# Patient Record
Sex: FEMALE | Race: WHITE | Employment: OTHER | URBAN - METROPOLITAN AREA
[De-identification: names, ages, dates, MRNs, and addresses within clinical notes are randomized per-mention and may not be internally consistent; named-entity substitution may affect disease eponyms.]

---

## 2017-09-14 ENCOUNTER — HOSPITAL ENCOUNTER (OUTPATIENT)
Dept: GENERAL RADIOLOGY | Age: 67
Discharge: HOME | End: 2017-09-14
Attending: OBSTETRICS & GYNECOLOGY
Payer: MEDICARE

## 2017-09-14 ENCOUNTER — HOSPITAL ENCOUNTER (OUTPATIENT)
Dept: PREADMISSION TESTING | Age: 67
Discharge: HOME | End: 2017-09-14
Attending: OBSTETRICS & GYNECOLOGY
Payer: MEDICARE

## 2017-09-14 VITALS
TEMPERATURE: 98.5 F | OXYGEN SATURATION: 97 % | SYSTOLIC BLOOD PRESSURE: 105 MMHG | HEIGHT: 64 IN | WEIGHT: 126.76 LBS | HEART RATE: 77 BPM | BODY MASS INDEX: 21.64 KG/M2 | DIASTOLIC BLOOD PRESSURE: 65 MMHG | RESPIRATION RATE: 18 BRPM

## 2017-09-14 LAB
ANION GAP SERPL CALC-SCNC: 7 MMOL/L (ref 5–15)
APPEARANCE UR: CLEAR
BACTERIA URNS QL MICRO: NEGATIVE /HPF
BILIRUB UR QL: NEGATIVE
BUN SERPL-MCNC: 25 MG/DL (ref 6–20)
BUN/CREAT SERPL: 27 (ref 12–20)
CALCIUM SERPL-MCNC: 8.7 MG/DL (ref 8.5–10.1)
CHLORIDE SERPL-SCNC: 105 MMOL/L (ref 97–108)
CO2 SERPL-SCNC: 28 MMOL/L (ref 21–32)
COLOR UR: NORMAL
CREAT SERPL-MCNC: 0.93 MG/DL (ref 0.55–1.02)
EPITH CASTS URNS QL MICRO: NORMAL /LPF
ERYTHROCYTE [DISTWIDTH] IN BLOOD BY AUTOMATED COUNT: 13.3 % (ref 11.5–14.5)
GLUCOSE SERPL-MCNC: 102 MG/DL (ref 65–100)
GLUCOSE UR STRIP.AUTO-MCNC: NEGATIVE MG/DL
HCT VFR BLD AUTO: 41 % (ref 35–47)
HGB BLD-MCNC: 13.4 G/DL (ref 11.5–16)
HGB UR QL STRIP: NEGATIVE
KETONES UR QL STRIP.AUTO: NEGATIVE MG/DL
LEUKOCYTE ESTERASE UR QL STRIP.AUTO: NEGATIVE
MCH RBC QN AUTO: 29.8 PG (ref 26–34)
MCHC RBC AUTO-ENTMCNC: 32.7 G/DL (ref 30–36.5)
MCV RBC AUTO: 91.1 FL (ref 80–99)
NITRITE UR QL STRIP.AUTO: NEGATIVE
PH UR STRIP: 7 [PH] (ref 5–8)
PLATELET # BLD AUTO: 312 K/UL (ref 150–400)
POTASSIUM SERPL-SCNC: 4.1 MMOL/L (ref 3.5–5.1)
PROT UR STRIP-MCNC: NEGATIVE MG/DL
RBC # BLD AUTO: 4.5 M/UL (ref 3.8–5.2)
RBC #/AREA URNS HPF: NORMAL /HPF (ref 0–5)
SODIUM SERPL-SCNC: 140 MMOL/L (ref 136–145)
SP GR UR REFRACTOMETRY: 1.01 (ref 1–1.03)
UA: UC IF INDICATED,UAUC: NORMAL
UROBILINOGEN UR QL STRIP.AUTO: 0.2 EU/DL (ref 0.2–1)
WBC # BLD AUTO: 6.3 K/UL (ref 3.6–11)
WBC URNS QL MICRO: NORMAL /HPF (ref 0–4)

## 2017-09-14 PROCEDURE — 81001 URINALYSIS AUTO W/SCOPE: CPT | Performed by: OBSTETRICS & GYNECOLOGY

## 2017-09-14 PROCEDURE — 36415 COLL VENOUS BLD VENIPUNCTURE: CPT | Performed by: OBSTETRICS & GYNECOLOGY

## 2017-09-14 PROCEDURE — 85027 COMPLETE CBC AUTOMATED: CPT | Performed by: OBSTETRICS & GYNECOLOGY

## 2017-09-14 PROCEDURE — 80048 BASIC METABOLIC PNL TOTAL CA: CPT | Performed by: OBSTETRICS & GYNECOLOGY

## 2017-09-14 PROCEDURE — 71020 XR CHEST PA LAT: CPT

## 2017-09-14 PROCEDURE — 93005 ELECTROCARDIOGRAM TRACING: CPT

## 2017-09-14 RX ORDER — ACETAMINOPHEN/DIPHENHYDRAMINE 500MG-25MG
1 TABLET ORAL
COMMUNITY

## 2017-09-14 RX ORDER — CHOLECALCIFEROL (VITAMIN D3) 125 MCG
300 CAPSULE ORAL
COMMUNITY

## 2017-09-14 RX ORDER — ROSUVASTATIN CALCIUM 5 MG/1
5 TABLET, COATED ORAL
COMMUNITY

## 2017-09-14 RX ORDER — CHOLECALCIFEROL (VITAMIN D3) 125 MCG
1 CAPSULE ORAL DAILY
COMMUNITY

## 2017-09-14 NOTE — PERIOP NOTES
[facility]  Preoperative Instructions        Surgery Date 09/28/17        Time of Arrival 8am    1. On the day of your surgery, please report to the Surgical Services Registration Desk and sign in at your designated time. The Surgery Center is located to the right of the Emergency Room. 2. You must have someone with you to drive you home. You should not drive a car for 24 hours following surgery. Please make arrangements for a friend or family member to stay with you for the first 24 hours after your surgery. 3. Do not have anything to eat or drink (including water, gum, mints, coffee, juice) after midnight 09/27/17?? Willian Dials ? This may not apply to medications prescribed by your physician. ?(Please note below the special instructions with medications to take the morning of your procedure.)    4. We recommend you do not drink any alcoholic beverages for 24 hours before and after your surgery. 5. Contact your surgeons office for instructions on the following medications: non-steroidal anti-inflammatory drugs (i.e. Advil, Aleve), vitamins, and supplements. (Some surgeons will want you to stop these medications prior to surgery and others may allow you to take them)  **If you are currently taking Plavix, Coumadin, Aspirin and/or other blood-thinning agents, contact your surgeon for instructions. ** Your surgeon will partner with the physician prescribing these medications to determine if it is safe to stop or if you need to continue taking. Please do not stop taking these medications without instructions from your surgeon    6. Wear comfortable clothes. Wear glasses instead of contacts. Do not bring any money or jewelry. Please bring picture ID, insurance card, and any prearranged co-payment or hospital payment. Do not wear make-up, particularly mascara the morning of your surgery. Do not wear nail polish, particularly if you are having foot /hand surgery.   Wear your hair loose or down, no ponytails, buns, vi pins or clips. All body piercings must be removed. Please shower with antibacterial soap for three consecutive days before and on the morning of surgery, but do not apply any lotions, powders or deodorants after the shower on the day of surgery. Please use a fresh towels after each shower. Please sleep in clean clothes and change bed linens the night before surgery. Please do not shave for 48 hours prior to surgery. Shaving of the face is acceptable. 7. You should understand that if you do not follow these instructions your surgery may be cancelled. If your physical condition changes (I.e. fever, cold or flu) please contact your surgeon as soon as possible. 8. It is important that you be on time. If a situation occurs where you may be late, please call [phone] (OR Holding Area). 9. If you have any questions and or problems, please call [phone] (Pre-admission Testing). 10. Your surgery time may be subject to change. You will receive a phone call the evening prior if your time changes. 11.  If having outpatient surgery, you must have someone to drive you here, stay with you during the duration of your stay, and to drive you home at time of discharge. 12.   In an effort to improve the efficiency, privacy, and safety for all of our Pre-op patients visitors are not allowed in the Holding area. Once you arrive and are registered your family/visitors will be asked to remain in the waiting room. The Pre-op staff will get you from the Surgical Waiting Area and will explain to you and your family/visitors that the Pre-op phase is beginning. The staff will answer any questions and provide instructions for tracking of the patient, by use of the existing tracking number and color-coded status board in the waiting room.   At this time the staff will also ask for your designated spokesperson information in the event that the physician or staff need to provide an update or obtain any pertinent information. The designated spokesperson will be notified if the physician needs to speak to family during the pre-operative phase. If at any time your family/visitors has questions or concerns they may approach the volunteer desk in the waiting area for assistance. Special Instructions:    MEDICATIONS TO TAKE THE MORNING OF SURGERY WITH A SIP OF WATER:none      I understand a pre-operative phone call will be made to verify my surgery time. In the event that I am not available, I give permission for a message to be left on my answering service and/or with another person?   {yes [phone]         ___________________      __________   _________    (Signature of Patient)             (Witness)                (Date and Time)

## 2017-09-15 LAB
ATRIAL RATE: 74 BPM
CALCULATED P AXIS, ECG09: 69 DEGREES
CALCULATED R AXIS, ECG10: -4 DEGREES
CALCULATED T AXIS, ECG11: 45 DEGREES
DIAGNOSIS, 93000: NORMAL
P-R INTERVAL, ECG05: 164 MS
Q-T INTERVAL, ECG07: 394 MS
QRS DURATION, ECG06: 66 MS
QTC CALCULATION (BEZET), ECG08: 437 MS
VENTRICULAR RATE, ECG03: 74 BPM

## 2017-09-15 NOTE — PERIOP NOTES
Patient called this morning asking if clearance, notes, and EKG had been sent from Dr. Skye Hoskins office. Notes and EKG received but not clearance. Dr. Kimberly Merida office called and clearance form requested. Fax received from Dr. Kimberly Merida office with clearance signature. Returned call to patient to confirm that information was received.

## 2017-09-27 NOTE — PERIOP NOTES
Called Dr Beatriz Pettit office and message for Susan-  in regard to surgical posting not matching order sheet.  to call me back.

## 2017-09-28 ENCOUNTER — ANESTHESIA EVENT (OUTPATIENT)
Dept: SURGERY | Age: 67
End: 2017-09-28
Payer: MEDICARE

## 2017-09-28 ENCOUNTER — ANESTHESIA (OUTPATIENT)
Dept: SURGERY | Age: 67
End: 2017-09-28
Payer: MEDICARE

## 2017-09-28 ENCOUNTER — HOSPITAL ENCOUNTER (OUTPATIENT)
Age: 67
Discharge: HOME | End: 2017-09-29
Attending: OBSTETRICS & GYNECOLOGY | Admitting: OBSTETRICS & GYNECOLOGY
Payer: MEDICARE

## 2017-09-28 PROBLEM — R32 INCONTINENCE OF URINE IN FEMALE: Status: ACTIVE | Noted: 2017-09-28

## 2017-09-28 PROBLEM — N81.10 VAGINAL PROLAPSE: Status: ACTIVE | Noted: 2017-09-28

## 2017-09-28 PROBLEM — N99.3 PELVIC RELAXATION DUE TO VAGINAL VAULT PROLAPSE, POSTHYSTERECTOMY: Status: ACTIVE | Noted: 2017-09-28

## 2017-09-28 PROCEDURE — 74011250636 HC RX REV CODE- 250/636

## 2017-09-28 PROCEDURE — 76210000016 HC OR PH I REC 1 TO 1.5 HR: Performed by: OBSTETRICS & GYNECOLOGY

## 2017-09-28 PROCEDURE — 77030031139 HC SUT VCRL2 J&J -A: Performed by: OBSTETRICS & GYNECOLOGY

## 2017-09-28 PROCEDURE — 76060000034 HC ANESTHESIA 1.5 TO 2 HR: Performed by: OBSTETRICS & GYNECOLOGY

## 2017-09-28 PROCEDURE — 77030008684 HC TU ET CUF COVD -B: Performed by: NURSE ANESTHETIST, CERTIFIED REGISTERED

## 2017-09-28 PROCEDURE — 77030002933 HC SUT MCRYL J&J -A: Performed by: OBSTETRICS & GYNECOLOGY

## 2017-09-28 PROCEDURE — 74011250636 HC RX REV CODE- 250/636: Performed by: OBSTETRICS & GYNECOLOGY

## 2017-09-28 PROCEDURE — 74011000258 HC RX REV CODE- 258: Performed by: OBSTETRICS & GYNECOLOGY

## 2017-09-28 PROCEDURE — 74011250636 HC RX REV CODE- 250/636: Performed by: ANESTHESIOLOGY

## 2017-09-28 PROCEDURE — 77030034849: Performed by: OBSTETRICS & GYNECOLOGY

## 2017-09-28 PROCEDURE — 74011250637 HC RX REV CODE- 250/637: Performed by: OBSTETRICS & GYNECOLOGY

## 2017-09-28 PROCEDURE — 77030002880 HC SUT CAPIO BSC -B: Performed by: OBSTETRICS & GYNECOLOGY

## 2017-09-28 PROCEDURE — 77030003029 HC SUT VCRL J&J -B: Performed by: OBSTETRICS & GYNECOLOGY

## 2017-09-28 PROCEDURE — 77030011640 HC PAD GRND REM COVD -A: Performed by: OBSTETRICS & GYNECOLOGY

## 2017-09-28 PROCEDURE — 74011000272 HC RX REV CODE- 272: Performed by: OBSTETRICS & GYNECOLOGY

## 2017-09-28 PROCEDURE — 77010033678 HC OXYGEN DAILY

## 2017-09-28 PROCEDURE — 77030008064 HC RNG RETRCTR COOP -B: Performed by: OBSTETRICS & GYNECOLOGY

## 2017-09-28 PROCEDURE — 77030010507 HC ADH SKN DERMBND J&J -B: Performed by: OBSTETRICS & GYNECOLOGY

## 2017-09-28 PROCEDURE — 77030018836 HC SOL IRR NACL ICUM -A: Performed by: OBSTETRICS & GYNECOLOGY

## 2017-09-28 PROCEDURE — 77030032490 HC SLV COMPR SCD KNE COVD -B: Performed by: OBSTETRICS & GYNECOLOGY

## 2017-09-28 PROCEDURE — 77030012961 HC IRR KT CYSTO/TUR ICUM -A: Performed by: OBSTETRICS & GYNECOLOGY

## 2017-09-28 PROCEDURE — 77030010011 HC RNG RETRCTR STAY COOP -B: Performed by: OBSTETRICS & GYNECOLOGY

## 2017-09-28 PROCEDURE — 74011000250 HC RX REV CODE- 250: Performed by: OBSTETRICS & GYNECOLOGY

## 2017-09-28 PROCEDURE — 74011000250 HC RX REV CODE- 250

## 2017-09-28 PROCEDURE — 76010000153 HC OR TIME 1.5 TO 2 HR: Performed by: OBSTETRICS & GYNECOLOGY

## 2017-09-28 PROCEDURE — C1771 REP DEV, URINARY, W/SLING: HCPCS | Performed by: OBSTETRICS & GYNECOLOGY

## 2017-09-28 PROCEDURE — 77030026438 HC STYL ET INTUB CARD -A: Performed by: NURSE ANESTHETIST, CERTIFIED REGISTERED

## 2017-09-28 PROCEDURE — 77030020782 HC GWN BAIR PAWS FLX 3M -B

## 2017-09-28 DEVICE — OBTURATOR SYSTEM, TENSION-FREE SUPPORT FOR INCONTINENCE
Type: IMPLANTABLE DEVICE | Site: BLADDER | Status: FUNCTIONAL
Brand: GYNECARE TVT

## 2017-09-28 RX ORDER — SODIUM CHLORIDE, SODIUM LACTATE, POTASSIUM CHLORIDE, CALCIUM CHLORIDE 600; 310; 30; 20 MG/100ML; MG/100ML; MG/100ML; MG/100ML
75 INJECTION, SOLUTION INTRAVENOUS CONTINUOUS
Status: DISCONTINUED | OUTPATIENT
Start: 2017-09-28 | End: 2017-09-28 | Stop reason: HOSPADM

## 2017-09-28 RX ORDER — ONDANSETRON 2 MG/ML
4 INJECTION INTRAMUSCULAR; INTRAVENOUS
Status: DISCONTINUED | OUTPATIENT
Start: 2017-09-28 | End: 2017-09-29 | Stop reason: HOSPADM

## 2017-09-28 RX ORDER — MORPHINE SULFATE 10 MG/ML
2 INJECTION, SOLUTION INTRAMUSCULAR; INTRAVENOUS
Status: DISCONTINUED | OUTPATIENT
Start: 2017-09-28 | End: 2017-09-28 | Stop reason: HOSPADM

## 2017-09-28 RX ORDER — FENTANYL CITRATE 50 UG/ML
50 INJECTION, SOLUTION INTRAMUSCULAR; INTRAVENOUS AS NEEDED
Status: DISCONTINUED | OUTPATIENT
Start: 2017-09-28 | End: 2017-09-28 | Stop reason: HOSPADM

## 2017-09-28 RX ORDER — ROCURONIUM BROMIDE 10 MG/ML
INJECTION, SOLUTION INTRAVENOUS AS NEEDED
Status: DISCONTINUED | OUTPATIENT
Start: 2017-09-28 | End: 2017-09-28 | Stop reason: HOSPADM

## 2017-09-28 RX ORDER — LIDOCAINE HYDROCHLORIDE 20 MG/ML
INJECTION, SOLUTION EPIDURAL; INFILTRATION; INTRACAUDAL; PERINEURAL AS NEEDED
Status: DISCONTINUED | OUTPATIENT
Start: 2017-09-28 | End: 2017-09-28 | Stop reason: HOSPADM

## 2017-09-28 RX ORDER — SODIUM CHLORIDE 0.9 % (FLUSH) 0.9 %
5-10 SYRINGE (ML) INJECTION AS NEEDED
Status: DISCONTINUED | OUTPATIENT
Start: 2017-09-28 | End: 2017-09-28 | Stop reason: HOSPADM

## 2017-09-28 RX ORDER — MIDAZOLAM HYDROCHLORIDE 1 MG/ML
1 INJECTION, SOLUTION INTRAMUSCULAR; INTRAVENOUS AS NEEDED
Status: DISCONTINUED | OUTPATIENT
Start: 2017-09-28 | End: 2017-09-28 | Stop reason: HOSPADM

## 2017-09-28 RX ORDER — DEXAMETHASONE SODIUM PHOSPHATE 4 MG/ML
INJECTION, SOLUTION INTRA-ARTICULAR; INTRALESIONAL; INTRAMUSCULAR; INTRAVENOUS; SOFT TISSUE AS NEEDED
Status: DISCONTINUED | OUTPATIENT
Start: 2017-09-28 | End: 2017-09-28 | Stop reason: HOSPADM

## 2017-09-28 RX ORDER — SUCCINYLCHOLINE CHLORIDE 20 MG/ML
INJECTION INTRAMUSCULAR; INTRAVENOUS AS NEEDED
Status: DISCONTINUED | OUTPATIENT
Start: 2017-09-28 | End: 2017-09-28 | Stop reason: HOSPADM

## 2017-09-28 RX ORDER — ZOLPIDEM TARTRATE 5 MG/1
5 TABLET ORAL
Status: DISCONTINUED | OUTPATIENT
Start: 2017-09-28 | End: 2017-09-29 | Stop reason: HOSPADM

## 2017-09-28 RX ORDER — HYDROMORPHONE HYDROCHLORIDE 1 MG/ML
1 INJECTION, SOLUTION INTRAMUSCULAR; INTRAVENOUS; SUBCUTANEOUS
Status: DISCONTINUED | OUTPATIENT
Start: 2017-09-28 | End: 2017-09-29 | Stop reason: HOSPADM

## 2017-09-28 RX ORDER — ACETAMINOPHEN 10 MG/ML
INJECTION, SOLUTION INTRAVENOUS AS NEEDED
Status: DISCONTINUED | OUTPATIENT
Start: 2017-09-28 | End: 2017-09-28 | Stop reason: HOSPADM

## 2017-09-28 RX ORDER — FENTANYL CITRATE 50 UG/ML
INJECTION, SOLUTION INTRAMUSCULAR; INTRAVENOUS AS NEEDED
Status: DISCONTINUED | OUTPATIENT
Start: 2017-09-28 | End: 2017-09-28 | Stop reason: HOSPADM

## 2017-09-28 RX ORDER — ONDANSETRON 2 MG/ML
INJECTION INTRAMUSCULAR; INTRAVENOUS AS NEEDED
Status: DISCONTINUED | OUTPATIENT
Start: 2017-09-28 | End: 2017-09-28 | Stop reason: HOSPADM

## 2017-09-28 RX ORDER — SODIUM CHLORIDE 9 MG/ML
50 INJECTION, SOLUTION INTRAVENOUS CONTINUOUS
Status: DISCONTINUED | OUTPATIENT
Start: 2017-09-28 | End: 2017-09-28 | Stop reason: HOSPADM

## 2017-09-28 RX ORDER — LIDOCAINE HYDROCHLORIDE 10 MG/ML
0.1 INJECTION, SOLUTION EPIDURAL; INFILTRATION; INTRACAUDAL; PERINEURAL AS NEEDED
Status: DISCONTINUED | OUTPATIENT
Start: 2017-09-28 | End: 2017-09-28 | Stop reason: HOSPADM

## 2017-09-28 RX ORDER — DEXTROSE MONOHYDRATE AND SODIUM CHLORIDE 5; .9 G/100ML; G/100ML
75 INJECTION, SOLUTION INTRAVENOUS CONTINUOUS
Status: DISCONTINUED | OUTPATIENT
Start: 2017-09-28 | End: 2017-09-29 | Stop reason: HOSPADM

## 2017-09-28 RX ORDER — MIDAZOLAM HYDROCHLORIDE 1 MG/ML
INJECTION, SOLUTION INTRAMUSCULAR; INTRAVENOUS AS NEEDED
Status: DISCONTINUED | OUTPATIENT
Start: 2017-09-28 | End: 2017-09-28 | Stop reason: HOSPADM

## 2017-09-28 RX ORDER — MIDAZOLAM HYDROCHLORIDE 1 MG/ML
0.5 INJECTION, SOLUTION INTRAMUSCULAR; INTRAVENOUS
Status: DISCONTINUED | OUTPATIENT
Start: 2017-09-28 | End: 2017-09-28 | Stop reason: HOSPADM

## 2017-09-28 RX ORDER — SODIUM CHLORIDE 0.9 % (FLUSH) 0.9 %
5-10 SYRINGE (ML) INJECTION AS NEEDED
Status: DISCONTINUED | OUTPATIENT
Start: 2017-09-28 | End: 2017-09-29 | Stop reason: HOSPADM

## 2017-09-28 RX ORDER — FENTANYL CITRATE 50 UG/ML
25 INJECTION, SOLUTION INTRAMUSCULAR; INTRAVENOUS
Status: DISCONTINUED | OUTPATIENT
Start: 2017-09-28 | End: 2017-09-28 | Stop reason: HOSPADM

## 2017-09-28 RX ORDER — DIPHENHYDRAMINE HYDROCHLORIDE 50 MG/ML
12.5 INJECTION, SOLUTION INTRAMUSCULAR; INTRAVENOUS AS NEEDED
Status: DISCONTINUED | OUTPATIENT
Start: 2017-09-28 | End: 2017-09-28 | Stop reason: HOSPADM

## 2017-09-28 RX ORDER — HYDROMORPHONE HYDROCHLORIDE 1 MG/ML
0.2 INJECTION, SOLUTION INTRAMUSCULAR; INTRAVENOUS; SUBCUTANEOUS
Status: DISCONTINUED | OUTPATIENT
Start: 2017-09-28 | End: 2017-09-28 | Stop reason: HOSPADM

## 2017-09-28 RX ORDER — PROPOFOL 10 MG/ML
INJECTION, EMULSION INTRAVENOUS AS NEEDED
Status: DISCONTINUED | OUTPATIENT
Start: 2017-09-28 | End: 2017-09-28 | Stop reason: HOSPADM

## 2017-09-28 RX ORDER — SODIUM CHLORIDE, SODIUM LACTATE, POTASSIUM CHLORIDE, CALCIUM CHLORIDE 600; 310; 30; 20 MG/100ML; MG/100ML; MG/100ML; MG/100ML
25 INJECTION, SOLUTION INTRAVENOUS CONTINUOUS
Status: DISCONTINUED | OUTPATIENT
Start: 2017-09-28 | End: 2017-09-28 | Stop reason: HOSPADM

## 2017-09-28 RX ORDER — SODIUM CHLORIDE 0.9 % (FLUSH) 0.9 %
5-10 SYRINGE (ML) INJECTION EVERY 8 HOURS
Status: DISCONTINUED | OUTPATIENT
Start: 2017-09-28 | End: 2017-09-28 | Stop reason: HOSPADM

## 2017-09-28 RX ORDER — OXYCODONE AND ACETAMINOPHEN 5; 325 MG/1; MG/1
1 TABLET ORAL AS NEEDED
Status: DISCONTINUED | OUTPATIENT
Start: 2017-09-28 | End: 2017-09-28 | Stop reason: HOSPADM

## 2017-09-28 RX ORDER — IBUPROFEN 400 MG/1
400 TABLET ORAL
Status: DISCONTINUED | OUTPATIENT
Start: 2017-09-28 | End: 2017-09-29 | Stop reason: HOSPADM

## 2017-09-28 RX ORDER — ONDANSETRON 2 MG/ML
4 INJECTION INTRAMUSCULAR; INTRAVENOUS AS NEEDED
Status: DISCONTINUED | OUTPATIENT
Start: 2017-09-28 | End: 2017-09-28 | Stop reason: HOSPADM

## 2017-09-28 RX ORDER — SODIUM CHLORIDE 0.9 % (FLUSH) 0.9 %
5-10 SYRINGE (ML) INJECTION EVERY 8 HOURS
Status: DISCONTINUED | OUTPATIENT
Start: 2017-09-28 | End: 2017-09-29 | Stop reason: HOSPADM

## 2017-09-28 RX ORDER — KETOROLAC TROMETHAMINE 30 MG/ML
INJECTION, SOLUTION INTRAMUSCULAR; INTRAVENOUS AS NEEDED
Status: DISCONTINUED | OUTPATIENT
Start: 2017-09-28 | End: 2017-09-28 | Stop reason: HOSPADM

## 2017-09-28 RX ADMIN — ROCURONIUM BROMIDE 5 MG: 10 INJECTION, SOLUTION INTRAVENOUS at 10:40

## 2017-09-28 RX ADMIN — MIDAZOLAM HYDROCHLORIDE 2 MG: 1 INJECTION, SOLUTION INTRAMUSCULAR; INTRAVENOUS at 10:38

## 2017-09-28 RX ADMIN — FENTANYL CITRATE 25 MCG: 50 INJECTION, SOLUTION INTRAMUSCULAR; INTRAVENOUS at 13:00

## 2017-09-28 RX ADMIN — PROPOFOL 20 MG: 10 INJECTION, EMULSION INTRAVENOUS at 11:30

## 2017-09-28 RX ADMIN — HYDROMORPHONE HYDROCHLORIDE 1 MG: 1 INJECTION, SOLUTION INTRAMUSCULAR; INTRAVENOUS; SUBCUTANEOUS at 14:44

## 2017-09-28 RX ADMIN — CEFAZOLIN 2 G: 10 INJECTION, POWDER, FOR SOLUTION INTRAVENOUS; PARENTERAL at 10:45

## 2017-09-28 RX ADMIN — PROPOFOL 100 MG: 10 INJECTION, EMULSION INTRAVENOUS at 10:40

## 2017-09-28 RX ADMIN — Medication 10 ML: at 14:44

## 2017-09-28 RX ADMIN — DEXTROSE MONOHYDRATE AND SODIUM CHLORIDE 75 ML/HR: 5; .9 INJECTION, SOLUTION INTRAVENOUS at 14:45

## 2017-09-28 RX ADMIN — SODIUM CHLORIDE, SODIUM LACTATE, POTASSIUM CHLORIDE, AND CALCIUM CHLORIDE 25 ML/HR: 600; 310; 30; 20 INJECTION, SOLUTION INTRAVENOUS at 08:36

## 2017-09-28 RX ADMIN — FENTANYL CITRATE 25 MCG: 50 INJECTION, SOLUTION INTRAMUSCULAR; INTRAVENOUS at 13:05

## 2017-09-28 RX ADMIN — ONDANSETRON 4 MG: 2 INJECTION INTRAMUSCULAR; INTRAVENOUS at 12:04

## 2017-09-28 RX ADMIN — ONDANSETRON 4 MG: 2 INJECTION INTRAMUSCULAR; INTRAVENOUS at 22:02

## 2017-09-28 RX ADMIN — KETOROLAC TROMETHAMINE 30 MG: 30 INJECTION, SOLUTION INTRAMUSCULAR; INTRAVENOUS at 12:04

## 2017-09-28 RX ADMIN — SUCCINYLCHOLINE CHLORIDE 100 MG: 20 INJECTION INTRAMUSCULAR; INTRAVENOUS at 10:40

## 2017-09-28 RX ADMIN — MIDAZOLAM HYDROCHLORIDE 2 MG: 1 INJECTION, SOLUTION INTRAMUSCULAR; INTRAVENOUS at 10:34

## 2017-09-28 RX ADMIN — ACETAMINOPHEN 1000 MG: 10 INJECTION, SOLUTION INTRAVENOUS at 10:57

## 2017-09-28 RX ADMIN — FENTANYL CITRATE 50 MCG: 50 INJECTION, SOLUTION INTRAMUSCULAR; INTRAVENOUS at 11:30

## 2017-09-28 RX ADMIN — LIDOCAINE HYDROCHLORIDE 60 MG: 20 INJECTION, SOLUTION EPIDURAL; INFILTRATION; INTRACAUDAL; PERINEURAL at 10:40

## 2017-09-28 RX ADMIN — FENTANYL CITRATE 50 MCG: 50 INJECTION, SOLUTION INTRAMUSCULAR; INTRAVENOUS at 10:40

## 2017-09-28 RX ADMIN — ZOLPIDEM TARTRATE 5 MG: 5 TABLET ORAL at 23:55

## 2017-09-28 RX ADMIN — HYDROMORPHONE HYDROCHLORIDE 1 MG: 1 INJECTION, SOLUTION INTRAMUSCULAR; INTRAVENOUS; SUBCUTANEOUS at 21:56

## 2017-09-28 RX ADMIN — ONDANSETRON 4 MG: 2 INJECTION INTRAMUSCULAR; INTRAVENOUS at 14:44

## 2017-09-28 RX ADMIN — DEXAMETHASONE SODIUM PHOSPHATE 8 MG: 4 INJECTION, SOLUTION INTRA-ARTICULAR; INTRALESIONAL; INTRAMUSCULAR; INTRAVENOUS; SOFT TISSUE at 10:48

## 2017-09-28 NOTE — H&P
Gynecology History and Physical    Name: Liam White MRN: [id number] SSN: [id number]    YOB: 1950  Age: 74 y.o. Sex: female       Subjective:      Chief complaint:  Pelvic relaxation and Urinary incontinence    Karime Olvera is a 74 y.o.  female with a history of cystocele. Previous workup included UD;LLP 85. Previous treatment measures included observation. She is admitted for Procedure(s) (LRB):  SACROSPINOUS LIGAMENT FIXATION AND CYSTOCELE REPAIR (N/A)  TVTO SLING (N/A). The current method of family planning is status post hysterectomy and post menopausal status. OB History     No data available        Past Medical History:   Diagnosis Date    Arrhythmia 2006    A-Fib; never took prescribed medication.  Hypercholesteremia     Ill-defined condition     mitral valve prolapse    Nausea & vomiting     nausea only    Other ill-defined conditions     seasonal allergies     Past Surgical History:   Procedure Laterality Date    ABDOMEN SURGERY PROC UNLISTED      HX ANKLE FRACTURE TX  1997, 1998    right   [facility]    right    HX CATARACT REMOVAL  2000    Left    HX COLECTOMY  2007    blockage from scar tissue; partial removed 10 inches    HX COLONOSCOPY  2013    HX DILATION AND CURETTAGE      x3    HX HEENT      deviated septum repair    HX HEENT      right eye lid surgery to correct droping    HX HYSTERECTOMY  1981    HX ORTHOPAEDIC      right carpal tunnel repair    HX ORTHOPAEDIC      right knee meniscus repair    HX OTHER SURGICAL  1989    hernia repair, left    HX OTHER SURGICAL  2001    right hernia repair    HX SHOULDER ARTHROSCOPY  1988    Right    HX SHOULDER ARTHROSCOPY  1988    Left    HX SHOULDER ARTHROSCOPY  2011    right    HX TONSILLECTOMY       Social History     Occupational History    Not on file. Social History Main Topics    Smoking status: Never Smoker    Smokeless tobacco: Never Used    Alcohol use No    Drug use:  No  Sexual activity: Not on file     Family History   Problem Relation Age of Onset    Heart Disease Mother     Hypertension Mother     Heart Disease Father         Allergies   Allergen Reactions    Codeine Nausea Only    Darvocet A500 [Propoxyphene N-Acetaminophen] Nausea Only    Flagyl [Metronidazole] Itching     Severe itching    Fosamax [Alendronate] Nausea Only and Other (comments)     headaches    Hydrocodone Other (comments)     Severe flushing    Lipitor [Atorvastatin] Other (comments)     Leg muscle pain    Nsaids (Non-Steroidal Anti-Inflammatory Drug) Other (comments)     Abdominal pain    Simvastatin Other (comments)     Leg muscle pain    Vagifem [Estradiol] Nausea Only and Other (comments)     headaches     Prior to Admission medications    Medication Sig Start Date End Date Taking? Authorizing Provider   OTHER Allergy shots for dust, mold, cats, dogs. Patient takes shots every three to four weeks   Yes Historical Provider   diphenhydrAMINE-acetaminophen (TYLENOL PM EXTRA STRENGTH)  mg tab Take 1 Tab by mouth nightly as needed. Yes Historical Provider   rosuvastatin (CRESTOR) 5 mg tablet Take 5 mg by mouth every Monday, Wednesday, Friday. Yes Historical Provider   co-enzyme Q-10 (CO Q-10) 100 mg capsule Take 300 mg by mouth four (4) days a week. Yes Historical Provider   cholecalciferol, vitamin D3, (VITAMIN D3) 2,000 unit tab Take 1 Tab by mouth daily. Yes Historical Provider   vit a,c & e-lutein-minerals (OCUVITE) tablet 1 Tab four (4) days a week. Yes Historical Provider   calcium-cholecalciferol, d3, (CALCIUM 600 + D) 600-125 mg-unit tab Take 2 Tabs by mouth daily (with lunch). Yes Historical Provider   ascorbic acid (VITAMIN C) 500 mg tablet Take 1,000 mg by mouth daily (with lunch). Yes Historical Provider   loratadine (CLARITIN) 10 mg tablet Take 10 mg by mouth nightly.    Yes Historical Provider   docusate sodium (COLACE) 100 mg capsule Take 100 mg by mouth nightly. Yes Historical Provider   aspirin 81 mg chewable tablet Take 81 mg by mouth nightly. Yes Historical Provider   fish oil-dha-epa 1,200-144-216 mg cap Take 1 Cap by mouth daily (after lunch). Yes Historical Provider   etidronate (DIDRONEL) 400 mg tablet Take 400 mg by mouth every fourteen (14) days. Historical Provider   conjugated estrogens (PREMARIN) 0.625 mg/gram vaginal cream Insert 0.5 g into vagina two (2) days a week. Historical Provider        Review of Systems:  A comprehensive review of systems was negative except for that written in the History of Present Illness. Objective:     Vitals:    09/28/17 0831   BP: 148/75   Pulse: 83   Resp: 18   Temp: 98.8 °F (37.1 °C)   SpO2: 98%   Weight: 56.1 kg (123 lb 10.9 oz)   Height: 5' 4\" (1.626 m)       Physical Exam:  Heart: Regular rate and rhythm  Lung: clear to auscultation throughout lung fields, no wheezes, no rales, no rhonchi and normal respiratory effort  Back: costovertebral angle tenderness absent  Abdomen: soft, nontender  External Genitalia: normal general appearance  Urinary system: urethral meatus normal  Vagina: cystocele present, grade3 and vaginal vault, grade2  Cervix: removed surgically  Adnexa: non palpable  Uterus: absent    Assessment:     Active Problems:    * No active hospital problems. *     Cystocele and Pelvic relaxation with urinary incontinence    Plan:     Procedure(s) (LRB):  SACROSPINOUS LIGAMENT FIXATION AND CYSTOCELE REPAIR (N/A)  TVTO SLING (N/A)  Discussed the risks of surgery including the risks of bleeding, infection, deep vein thrombosis, and surgical injuries to internal organs including but not limited to the bowels, bladder, rectum, and female reproductive organs. The patient understands the risks; any and all questions were answered to the patient's satisfaction.     Signed By:  Sapphire Maldonado MD     September 28, 2017

## 2017-09-28 NOTE — PERIOP NOTES
1232- Handoff Report from Operating Room to PACU    Report received from [address] [place] [address] and MARSHA Rivera regarding Silke Rizzo. Surgeon(s):  Candace Baptiste MD  And Procedure(s) (LRB):  SACROSPINOUS LIGAMENT FIXATION AND CYSTOCELE REPAIR (N/A)  TVTO SLING (N/A)  confirmed   with allergies, drains and dressings discussed. Anesthesia type, drugs, patient history, complications, estimated blood loss, vital signs, intake and output, and last pain medication, lines, reversal medications and temperature were reviewed. 1340-TRANSFER - OUT REPORT:    Verbal report given to Sugar Vela RN(name) on Silke Rizzo  being transferred to gen surg(unit) for routine post - op       Report consisted of patients Situation, Background, Assessment and   Recommendations(SBAR). Information from the following report(s) SBAR, Kardex, OR Summary, Procedure Summary, Intake/Output, MAR and Recent Results was reviewed with the receiving nurse. Opportunity for questions and clarification was provided.       Patient transported with:   O2 @ 2 liters  Tech

## 2017-09-28 NOTE — PROGRESS NOTES
Problem: Falls - Risk of  Goal: *Absence of Falls  Document Don Fall Risk and appropriate interventions in the flowsheet. Outcome: Progressing Towards Goal  Fall Risk Interventions: Bed locked. Call bell within reach. Family at  Bedside. Gripper socks on. Patient will not sustain a fall during this hospitalization.

## 2017-09-28 NOTE — BRIEF OP NOTE
BRIEF OPERATIVE NOTE    Date of Procedure: 9/28/2017   Preoperative Diagnosis: CYSTOCELE, PROLAPSE  Postoperative Diagnosis: CYSTOCELE, PROLAPSE    Procedure(s):  SACROSPINOUS LIGAMENT FIXATION AND CYSTOCELE REPAIR  TVTO SLING  Surgeon(s) and Role:     * Arti Townsend MD - Primary         Assistant Staff:       Surgical Staff:  Circ-1: Gina Patel  Scrub Tech-1: Laurie Lamb  Surg Asst-1: Taras Drain Liggitt  Surg Asst-2: Kelsey Major  Surg Asst-Relief: Kaci Galvan RN  Event Time In   Incision Start 1100   Incision Close      Anesthesia: General   Estimated Blood Loss: 30  Specimens: * No specimens in log *   Findings: normal bladder/urethra   Complications: none known  Implants:   Implant Name Type Inv.  Item Serial No.  Lot No. LRB No. Used Action   MESH OBTURATOR TVT LASER CUT -- GYNECARE TVT - [id number]   MESH OBTURATOR TVT LASER CUT -- GYNECARE TVT [id number] [id number] N/A 1 Implanted

## 2017-09-28 NOTE — OP NOTES
[address]   OP NOTE       Name:  Lino De   MR#:  [id number]   :  1950   Account #:  [de-identified]    Surgery Date:  2017   Date of Adm:  2017       PREOPERATIVE DIAGNOSES   1. Pelvic relaxation with cystocele and apical descent. 2. Stress urinary incontinence. POSTOPERATIVE DIAGNOSIS:     PROCEDURES PERFORMED   1. Sacrospinous ligament suspension. 2. Cystocele repair. 3. Tension-free vaginal tape-obturator sling. ANESTHESIA: General anesthesia with endotracheal intubation. ESTIMATED BLOOD LOSS: 30 mL. COMPLICATIONS: None known. SPECIMENS REMOVED: None. PRESENTATION: The patient is a 59-year-old with a grade 3   cystocele, grade 2 apical descent, and leak-point pressure of 85, with   clinical stress incontinence. DESCRIPTION OF PROCEDURE: She was taken to the operating   room, where she was prepped and draped in standard sterile fashion. Chow catheter was placed. Exam confirmed the above findings. A   [place] retractor was used to aid in visualization and the vaginal   mucosa was grasped with Allis clamps and cut in the midline with the   Bovie cautery. The mucosa was dissected off the underlying bladder   and fascia laterally and up to the vaginal apex. A single digit was used   to dissect up to the sacrospinous ligament on the patient's right side. We chose the right side because she has chronic left hip pain. Two permanent sutures of polypropylene was placed approximately 1   and 2 cm from the spine and noted to have good purchase on tissue. These were then placed through a 1 cm bite of vaginal apex. The   cystocele was then repaired with several layers of 0 Vicryl, plicating to   the midline. The top 3 sutures were placed and tied down, repairing the   mucosal incision from the anterior vagina.  Sacrospinous ligament   sutures were then tied down, elevating the cuff nicely. The remainder   of the vaginal incision was then closed with interrupted 0 Vicryl   sutures. The attention was turned to the sling and the suburethral tissue and   sulcus on each side were infiltrated with normal saline. Incision was   made underneath the urethra and dissected with Metzenbaums   laterally toward the obturator on each side. The urethral protector was   placed and noted to have no buttoning or problems with the vaginal   sulcus, and the sling was placed laterally, exiting at the level of the   clitoris, avoiding the abductor muscle group. This was accomplished   on both sides. A cystoscopy was then performed, revealing normal bladder,   normal ureteral efflux bilaterally, well lateral of the plications. No   damage from the sling, either ureter, or bladder. The mesh was then   tensioned loosely to the mid urethra, assured to be lying flat and   without tension. The plastic sleeves were removed and mesh trimmed   at the skin level. Vaginal mucosa was closed with a 2-0 Vicryl running   stitch. The resultant repair revealed excellent apical and anterior   repair. There was scant bleeding. Thus, I did not put any packing in. She returned to the recovery room in good condition after Dermabond   to the skin incision.         MD JASIEL Sage / Keerthi Burns   D:  09/28/2017   12:10   T:  09/28/2017   12:42   Job #:  [id number]

## 2017-09-28 NOTE — ANESTHESIA POSTPROCEDURE EVALUATION
Post-Anesthesia Evaluation and Assessment    Patient: Yumiko Negron MRN: [id number]  SSN: [id number]    YOB: 1950  Age: 74 y.o. Sex: female       Cardiovascular Function/Vital Signs  Visit Vitals    /67    Pulse 82    Temp 36.7 °C (98 °F)    Resp 21    Ht 5' 4\" (1.626 m)    Wt 56.1 kg (123 lb 10.9 oz)    SpO2 98%    BMI 21.23 kg/m2       Patient is status post general anesthesia for Procedure(s):  SACROSPINOUS LIGAMENT FIXATION AND CYSTOCELE REPAIR  TVTO SLING. Nausea/Vomiting: None    Postoperative hydration reviewed and adequate. Pain:  Pain Scale 1: FLACC (09/28/17 1315)  Pain Intensity 1: 0 (09/28/17 1315)   Managed    Neurological Status:   Neuro (WDL): Within Defined Limits (09/28/17 1233)  Neuro  Neurologic State: Alert (09/28/17 1233)  Orientation Level: Oriented X4 (09/28/17 1233)  Cognition: Follows commands (09/28/17 1233)  Speech: Clear (09/28/17 1233)  LUE Motor Response: Purposeful (09/28/17 1233)  LLE Motor Response: Purposeful (09/28/17 1233)  RUE Motor Response: Purposeful (09/28/17 1233)  RLE Motor Response: Purposeful (09/28/17 1233)   At baseline    Mental Status and Level of Consciousness: Alert and oriented     Pulmonary Status:   O2 Device: Nasal cannula (09/28/17 1315)   Adequate oxygenation and airway patent    Complications related to anesthesia: None    Post-anesthesia assessment completed.  No concerns    Signed By: Michelle Nova DO     September 28, 2017

## 2017-09-28 NOTE — IP AVS SNAPSHOT
[address] [facility]. 
[phone] Patient: Deonna Vera MRN: [id number] :1950 You are allergic to the following Allergen Reactions Codeine Nausea Only Darvocet A500 (Propoxyphene N-Acetaminophen) Nausea Only Flagyl (Metronidazole) Itching Severe itching Fosamax (Alendronate) Nausea Only Other (comments)  
 headaches Hydrocodone Other (comments) Severe flushing Lipitor (Atorvastatin) Other (comments) Leg muscle pain  
    
 Nsaids (Non-Steroidal Anti-Inflammatory Drug) Other (comments) Abdominal pain Simvastatin Other (comments) Leg muscle pain Vagifem (Estradiol) Nausea Only Other (comments)  
 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 [facility] 
[phone] Current Discharge Medication List  
  
START taking these medications Dose & Instructions Dispensing Information Comments Morning Noon Evening Bedtime HYDROmorphone 2 mg tablet Commonly known as:  DILAUDID Your last dose was: Your next dose is:    
   
   
 Dose:  2 mg Take 1 Tab by mouth every four (4) hours as needed for Pain. Max Daily Amount: 12 mg. Quantity:  20 Tab Refills:  0 CONTINUE these medications which have NOT CHANGED Dose & Instructions Dispensing Information Comments Morning Noon Evening Bedtime  
 aspirin 81 mg chewable tablet Your last dose was: Your next dose is:    
   
   
 Dose:  81 mg Take 81 mg by mouth nightly. Refills:  0  
     
   
   
   
  
 CALCIUM 600 + D 600-125 mg-unit Tab Generic drug:  calcium-cholecalciferol (d3) Your last dose was: Your next dose is:    
   
   
 Dose:  2 Tab Take 2 Tabs by mouth daily (with lunch). Refills:  0 CLARITIN 10 mg tablet Generic drug:  loratadine Your last dose was: Your next dose is:    
   
   
 Dose:  10 mg Take 10 mg by mouth nightly. Refills:  0  
     
   
   
   
  
 CO Q-10 100 mg capsule Generic drug:  co-enzyme Q-10 Your last dose was: Your next dose is:    
   
   
 Dose:  300 mg Take 300 mg by mouth four (4) days a week. Refills:  0  
     
   
   
   
  
 COLACE 100 mg capsule Generic drug:  docusate sodium Your last dose was: Your next dose is:    
   
   
 Dose:  100 mg Take 100 mg by mouth nightly. Refills:  0  
     
   
   
   
  
 CRESTOR 5 mg tablet Generic drug:  rosuvastatin Your last dose was: Your next dose is:    
   
   
 Dose:  5 mg Take 5 mg by mouth every Monday, Wednesday, Friday. Refills:  0 etidronate 400 mg tablet Commonly known as:  Charles Ott Your last dose was: Your next dose is:    
   
   
 Dose:  400 mg Take 400 mg by mouth every fourteen (14) days. Refills:  0  
     
   
   
   
  
 fish oil-dha-epa 1,200-144-216 mg Cap Your last dose was: Your next dose is:    
   
   
 Dose:  1 Cap Take 1 Cap by mouth daily (after lunch). Refills:  0  
     
   
   
   
  
 OTHER Your last dose was: Your next dose is: Allergy shots for dust, mold, cats, dogs. Patient takes shots every three to four weeks Refills:  0 PREMARIN 0.625 mg/gram vaginal cream  
Generic drug:  conjugated estrogens Your last dose was: Your next dose is:    
   
   
 Dose:  0.5 g Insert 0.5 g into vagina two (2) days a week. Refills:  0  
     
   
   
   
  
 TYLENOL PM EXTRA STRENGTH  mg Tab Generic drug:  diphenhydrAMINE-acetaminophen Your last dose was: Your next dose is:    
   
   
 Dose:  1 Tab Take 1 Tab by mouth nightly as needed. Refills:  0  
     
   
   
   
  
 vit a,c & e-lutein-minerals tablet Commonly known as:  All Pop Your last dose was: Your next dose is:    
   
   
 Dose:  1 Tab 1 Tab four (4) days a week. Refills:  0  
     
   
   
   
  
 VITAMIN C 500 mg tablet Generic drug:  ascorbic acid (vitamin C) Your last dose was: Your next dose is:    
   
   
 Dose:  1000 mg Take 1,000 mg by mouth daily (with lunch). Refills:  0  
     
   
   
   
  
 VITAMIN D3 2,000 unit Tab Generic drug:  cholecalciferol (vitamin D3) Your last dose was: Your next dose is:    
   
   
 Dose:  1 Tab Take 1 Tab by mouth daily. Refills:  0 Where to Get Your Medications Information on where to get these meds will be given to you by the nurse or doctor. ! Ask your nurse or doctor about these medications HYDROmorphone 2 mg tablet Discharge Instructions SURGERY DISCHARGE INSTRUCTIONS PATIENT NAME:    Kathryn Pride PROCEDURE: SSLF,Cystocele repair Urethral Sling 
 
 
(NO)Bladder Catheter Removal (ONLY IF BOX MARKED YES) On the morning after discharge, please remove catheter by 9am. If you have any difficulty removing the catheter, please call our office. Your bladder will be empty and should fill over the next several hours. If you are not able to urinate by 1-2pm, please call our office. After discharge, please follow these guidelines: 
1. You may shower. Please avoid baths for 4 weeks. 2. Avoid lifting (over 10 pounds) for 6 weeks. 3. Avoid sexual intercourse for 6 weeks. 4.  Avoid driving for 10 days. You may resume driving at that point if you are no longer taking prescription pain medications and feel that you are physically able to react appropriately to potential road hazards. 5. You may resume a regular diet. 6. You may resume your regular medications. 7. You may return to work in 1-2 weeks, if no lifting or physical activity is required. Some patients may require more time prior to returning to work. 8. You have been given a prescription for: An antibiotic (Name none needed ). Please take this until all the tablets are completed. A pain medication (Name dilaudid ). Please take this if needed. 9. If you experience constipation, please use a stool softener, which may be purchased at your pharmacy. Please ask your pharmacist for help if you have any questions. After your surgery, you may experience: 1. Small amounts of vaginal bleeding. This may persist intermittently for up to several weeks after surgery. 2. Small discomfort and stiffness to inner groin. These are normal and should resolve. If any of the following occur, please contact you physician: 1. Fever of 100.5 or greater. 2. Pain unrelieved by medication. 3. Persistent bleeding that does not resolve within 72 hours or a large presence of blood in your urine. FOLLOW-UP: 
 
1. At 4-weeks after surgery. Dr. Nimco Freeman will discuss with you how you are doing and perform a pelvic exam to check the surgery site. Please call Dr. Lorna Wilkinson office if you have any questions regarding your appointments ([phone]). After regular office hours and on weekends, there is always a [facility] physician available on call. You can reach the physician by calling [phone]. If you feel that you are experiencing an emergency, or you are unable to reach the physician on call, please go to the nearest emergency department for evaluation. Discharge Orders None [employer] Announcement We are excited to announce that we are making your provider's discharge notes available to you in [employer]. You will see these notes when they are completed and signed by the physician that discharged you from your recent hospital stay. If you have any questions or concerns about any information you see in [employer], please call the Health Information Department where you were seen or reach out to your Primary Care Provider for more information about your plan of care. Introducing [facility]! Dear Nima Childs: 
Thank you for requesting a [employer] account. Our records indicate that you have previously registered for a [employer] account but its currently inactive. Please call our [employer] support line at [phone]. Additional Information If you have questions, please visit the Frequently Asked Questions section of the [employer] website at [email or web address] Remember, [employer] is NOT to be used for urgent needs. For medical emergencies, dial 911. Now available from your iPhone and Android! General Information Please provide this summary of care documentation to your next provider. Patient Signature:  ____________________________________________________________ Date:  ____________________________________________________________  
  
Keira Charter Provider Signature:  ____________________________________________________________ Date:  ____________________________________________________________

## 2017-09-28 NOTE — PROGRESS NOTES
Primary Nurse Faizan Guy and Kierra Gomez, RN performed a dual skin assessment on this patient No impairment noted  Samuel score is 23

## 2017-09-28 NOTE — ANESTHESIA PREPROCEDURE EVALUATION
Anesthetic History     PONV          Review of Systems / Medical History  Patient summary reviewed, nursing notes reviewed and pertinent labs reviewed    Pulmonary  Within defined limits                 Neuro/Psych             Comments: right carpal tunnel repair Cardiovascular            Dysrhythmias : atrial fibrillation  Hyperlipidemia    Exercise tolerance: >4 METS     GI/Hepatic/Renal     GERD: well controlled           Endo/Other  Within defined limits           Other Findings              Physical Exam    Airway  Mallampati: II  TM Distance: 4 - 6 cm  Neck ROM: normal range of motion   Mouth opening: Normal     Cardiovascular  Regular rate and rhythm,  S1 and S2 normal,  no murmur, click, rub, or gallop  Rhythm: regular  Rate: normal         Dental    Dentition: Caps/crowns and Bridges     Pulmonary  Breath sounds clear to auscultation               Abdominal  GI exam deferred       Other Findings            Anesthetic Plan    ASA: 2  Anesthesia type: general          Induction: Intravenous  Anesthetic plan and risks discussed with: Patient

## 2017-09-28 NOTE — IP AVS SNAPSHOT
[address] [facility] 
[phone] Patient: Antonino Fuentes MRN: [id number] :1950 Current Discharge Medication List  
  
START taking these medications Dose & Instructions Dispensing Information Comments Morning Noon Evening Bedtime HYDROmorphone 2 mg tablet Commonly known as:  DILAUDID Your last dose was: Your next dose is:    
   
   
 Dose:  2 mg Take 1 Tab by mouth every four (4) hours as needed for Pain. Max Daily Amount: 12 mg. Quantity:  20 Tab Refills:  0 CONTINUE these medications which have NOT CHANGED Dose & Instructions Dispensing Information Comments Morning Noon Evening Bedtime  
 aspirin 81 mg chewable tablet Your last dose was: Your next dose is:    
   
   
 Dose:  81 mg Take 81 mg by mouth nightly. Refills:  0  
     
   
   
   
  
 CALCIUM 600 + D 600-125 mg-unit Tab Generic drug:  calcium-cholecalciferol (d3) Your last dose was: Your next dose is:    
   
   
 Dose:  2 Tab Take 2 Tabs by mouth daily (with lunch). Refills:  0 CLARITIN 10 mg tablet Generic drug:  loratadine Your last dose was: Your next dose is:    
   
   
 Dose:  10 mg Take 10 mg by mouth nightly. Refills:  0  
     
   
   
   
  
 CO Q-10 100 mg capsule Generic drug:  co-enzyme Q-10 Your last dose was: Your next dose is:    
   
   
 Dose:  300 mg Take 300 mg by mouth four (4) days a week. Refills:  0  
     
   
   
   
  
 COLACE 100 mg capsule Generic drug:  docusate sodium Your last dose was: Your next dose is:    
   
   
 Dose:  100 mg Take 100 mg by mouth nightly. Refills:  0  
     
   
   
   
  
 CRESTOR 5 mg tablet Generic drug:  rosuvastatin Your last dose was: Your next dose is:    
   
   
 Dose:  5 mg Take 5 mg by mouth every Monday, Wednesday, Friday. Refills:  0  
     
   
   
   
  
 etidronate 400 mg tablet Commonly known as:  Anahy Josue Your last dose was: Your next dose is:    
   
   
 Dose:  400 mg Take 400 mg by mouth every fourteen (14) days. Refills:  0  
     
   
   
   
  
 fish oil-dha-epa 1,200-144-216 mg Cap Your last dose was: Your next dose is:    
   
   
 Dose:  1 Cap Take 1 Cap by mouth daily (after lunch). Refills:  0  
     
   
   
   
  
 OTHER Your last dose was: Your next dose is: Allergy shots for dust, mold, cats, dogs. Patient takes shots every three to four weeks Refills:  0 PREMARIN 0.625 mg/gram vaginal cream  
Generic drug:  conjugated estrogens Your last dose was: Your next dose is:    
   
   
 Dose:  0.5 g Insert 0.5 g into vagina two (2) days a week. Refills:  0  
     
   
   
   
  
 TYLENOL PM EXTRA STRENGTH  mg Tab Generic drug:  diphenhydrAMINE-acetaminophen Your last dose was: Your next dose is:    
   
   
 Dose:  1 Tab Take 1 Tab by mouth nightly as needed. Refills:  0  
     
   
   
   
  
 vit a,c & e-lutein-minerals tablet Commonly known as:  Seble Canchola Your last dose was: Your next dose is:    
   
   
 Dose:  1 Tab 1 Tab four (4) days a week. Refills:  0  
     
   
   
   
  
 VITAMIN C 500 mg tablet Generic drug:  ascorbic acid (vitamin C) Your last dose was: Your next dose is:    
   
   
 Dose:  1000 mg Take 1,000 mg by mouth daily (with lunch). Refills:  0  
     
   
   
   
  
 VITAMIN D3 2,000 unit Tab Generic drug:  cholecalciferol (vitamin D3) Your last dose was: Your next dose is:    
   
   
 Dose:  1 Tab Take 1 Tab by mouth daily. Refills:  0 Where to Get Your Medications Information on where to get these meds will be given to you by the nurse or doctor. ! Ask your nurse or doctor about these medications HYDROmorphone 2 mg tablet

## 2017-09-28 NOTE — PROGRESS NOTES
General Surgery End of Shift Nursing Note    Bedside shift change report given to *** (oncoming nurse) by Mireille Yepez RN (offgoing nurse). Report included the following information SBAR, Kardex, Intake/Output, MAR and Recent Results. Shift worked:   Day shift   Significant changes during shift:       Non-emergent issues for physician to address:        Number times ambulated in hallway past shift: {NUMBERS 0-5 [[id number]]}    Number of times OOB to chair past shift: {NUMBERS 0-5 [[id number]]}    Pain Management:  Current medication: dilaudid  Patient states pain is manageable on current pain medication: YES    GI:    Current diet:  DIET CLEAR LIQUID    Tolerating current diet: YES  Passing flatus: YES  Last Bowel Movement: yesterday   Appearance:     Respiratory:    Incentive Spirometer at bedside: YES  Patient instructed on use: YES    Patient Safety:    Falls Score: 1  Bed Alarm On? No  Sitter?  No    Digna Flores

## 2017-09-29 VITALS
DIASTOLIC BLOOD PRESSURE: 65 MMHG | SYSTOLIC BLOOD PRESSURE: 112 MMHG | HEART RATE: 73 BPM | RESPIRATION RATE: 20 BRPM | BODY MASS INDEX: 21.11 KG/M2 | OXYGEN SATURATION: 100 % | TEMPERATURE: 98.5 F | WEIGHT: 123.68 LBS | HEIGHT: 64 IN

## 2017-09-29 LAB
ANION GAP SERPL CALC-SCNC: 5 MMOL/L (ref 5–15)
BUN SERPL-MCNC: 13 MG/DL (ref 6–20)
BUN/CREAT SERPL: 15 (ref 12–20)
CALCIUM SERPL-MCNC: 7.8 MG/DL (ref 8.5–10.1)
CHLORIDE SERPL-SCNC: 106 MMOL/L (ref 97–108)
CO2 SERPL-SCNC: 28 MMOL/L (ref 21–32)
CREAT SERPL-MCNC: 0.85 MG/DL (ref 0.55–1.02)
GLUCOSE SERPL-MCNC: 93 MG/DL (ref 65–100)
HCT VFR BLD AUTO: 34 % (ref 35–47)
HGB BLD-MCNC: 11.1 G/DL (ref 11.5–16)
POTASSIUM SERPL-SCNC: 3.9 MMOL/L (ref 3.5–5.1)
SODIUM SERPL-SCNC: 139 MMOL/L (ref 136–145)

## 2017-09-29 PROCEDURE — 85018 HEMOGLOBIN: CPT | Performed by: OBSTETRICS & GYNECOLOGY

## 2017-09-29 PROCEDURE — 80048 BASIC METABOLIC PNL TOTAL CA: CPT | Performed by: OBSTETRICS & GYNECOLOGY

## 2017-09-29 PROCEDURE — 74011250636 HC RX REV CODE- 250/636: Performed by: OBSTETRICS & GYNECOLOGY

## 2017-09-29 PROCEDURE — 77010033678 HC OXYGEN DAILY

## 2017-09-29 PROCEDURE — 36415 COLL VENOUS BLD VENIPUNCTURE: CPT | Performed by: OBSTETRICS & GYNECOLOGY

## 2017-09-29 RX ORDER — HYDROMORPHONE HYDROCHLORIDE 2 MG/1
2 TABLET ORAL
Qty: 20 TAB | Refills: 0 | Status: SHIPPED | OUTPATIENT
Start: 2017-09-29

## 2017-09-29 RX ADMIN — HYDROMORPHONE HYDROCHLORIDE 1 MG: 1 INJECTION, SOLUTION INTRAMUSCULAR; INTRAVENOUS; SUBCUTANEOUS at 08:51

## 2017-09-29 RX ADMIN — ONDANSETRON 4 MG: 2 INJECTION INTRAMUSCULAR; INTRAVENOUS at 09:43

## 2017-09-29 RX ADMIN — Medication 10 ML: at 09:43

## 2017-09-29 NOTE — DISCHARGE INSTRUCTIONS
SURGERY DISCHARGE INSTRUCTIONS    PATIENT NAME:    Mague Baldwin           PROCEDURE: SSLF,Cystocele repair Urethral Sling      (NO)Bladder Catheter Removal (ONLY IF BOX MARKED YES)  On the morning after discharge, please remove catheter by 9am. If you have any difficulty removing the catheter, please call our office. Your bladder will be empty and should fill over the next several hours. If you are not able to urinate by 1-2pm, please call our office. After discharge, please follow these guidelines:  1. You may shower. Please avoid baths for 4 weeks. 2. Avoid lifting (over 10 pounds) for 6 weeks. 3. Avoid sexual intercourse for 6 weeks. 4.  Avoid driving for 10 days. You may resume driving at that point if you are no longer taking prescription pain medications and feel that you are physically able to react appropriately to potential road hazards. 5. You may resume a regular diet. 6. You may resume your regular medications. 7. You may return to work in 1-2 weeks, if no lifting or physical activity is required. Some patients may require more time prior to returning to work. 8. You have been given a prescription for: An antibiotic (Name none needed ). Please take this until all the tablets are completed. A pain medication (Name dilaudid ). Please take this if needed. 9. If you experience constipation, please use a stool softener, which may be purchased at your pharmacy. Please ask your pharmacist for help if you have any questions. After your surgery, you may experience:  1. Small amounts of vaginal bleeding. This may persist intermittently for up to several weeks after surgery. 2. Small discomfort and stiffness to inner groin. These are normal and should resolve. If any of the following occur, please contact you physician:  1. Fever of 100.5 or greater. 2. Pain unrelieved by medication.   3. Persistent bleeding that does not resolve within 72 hours or a large presence of blood in your urine. FOLLOW-UP:    1. At 4-weeks after surgery. Dr. Genevive Riedel will discuss with you how you are doing and perform a pelvic exam to check the surgery site. Please call Dr. Lukasz Jackson office if you have any questions regarding your appointments ([phone]). After regular office hours and on weekends, there is always a [facility] physician available on call. You can reach the physician by calling [phone]. If you feel that you are experiencing an emergency, or you are unable to reach the physician on call, please go to the nearest emergency department for evaluation.

## 2017-09-29 NOTE — PROGRESS NOTES
General Surgery End of Shift Nursing Note    Bedside shift change report given to Desi Saenz (oncoming nurse) by Butch Mclean (offgoing nurse). Report included the following information SBAR, Kardex and MAR. Shift worked:   7p-7a   Significant changes during shift:    Chow out, has voided 200ml   Non-emergent issues for physician to address:   none     Number times ambulated in hallway past shift: 1    Number of times OOB to chair past shift: 0    Pain Management:  Current medication: dilaudid 1mg iv  Patient states pain is manageable on current pain medication: YES    GI:    Current diet:  DIET CLEAR LIQUID    Tolerating current diet: YES  Passing flatus: YES  Last Bowel Movement: yesterday   Appearance:     Patient Safety:    Falls Score: 1  Bed Alarm On? No  Sitter?  No    Tonya Mock RN

## 2017-09-29 NOTE — PROGRESS NOTES
Pt is under observation and CM was not made aware of any discharge needs.      Anne Chiang, NICK, [address] [facility]   [phone]

## 2017-09-29 NOTE — DISCHARGE SUMMARY
Ayleen Baeza was admitted for surgical management of her pelvic organ prolapse. Surgical repair was undertaken. Please see her operative report for full details. She had an uneventful overnight stay. Her vital signs were within stable limits, she tolerated a regular diet, had good urine output, ambulated, had pain controlled with an oral regimen. On the morning of postoperative day one, her nunez catheter and packing were removed and and a voiding trial done. She was given a full set of preprinted discharge instructions detailing restrictions, postoperative appointments, and general care. She indicated an understanding of these instructions and was discharged without further issue.

## 2017-09-29 NOTE — PROGRESS NOTES
Gynecology Progress Note    Patient doing well post-op day 1 from Procedure(s):  SACROSPINOUS LIGAMENT FIXATION AND CYSTOCELE REPAIR  TVTO SLING without significant complaints. Pain controlled on current medication. Voiding without difficulty. Patient is passing flatus. Vitals:  Blood pressure 108/62, pulse 64, temperature 98 °F (36.7 °C), resp. rate 18, height 5' 4\" (1.626 m), weight 56.1 kg (123 lb 10.9 oz), SpO2 98 %. Temp (24hrs), Av.3 °F (36.8 °C), Min:98 °F (36.7 °C), Max:98.8 °F (37.1 °C)        Exam:  Patient without distress. Abdomen soft,  nontender. Incision dry and clean without erythema. Lower extremities are negative for swelling, cords, or tenderness. Lab/Data Review:  BMP:   Lab Results   Component Value Date/Time     2017 04:18 AM    K 3.9 2017 04:18 AM     2017 04:18 AM    CO2 28 2017 04:18 AM    AGAP 5 2017 04:18 AM    GLU 93 2017 04:18 AM    BUN 13 2017 04:18 AM    CREA 0.85 2017 04:18 AM    GFRAA >60 2017 04:18 AM    GFRNA >60 2017 04:18 AM     CBC:   Lab Results   Component Value Date/Time    HGB 11.1 (L) 2017 04:18 AM    HCT 34.0 (L) 2017 04:18 AM       Assessment and Plan:  Patient appears to be having uncomplicated post Procedure(s):  SACROSPINOUS LIGAMENT FIXATION AND CYSTOCELE REPAIR  TVTO SLING course. Continue routine post-op care.

## 2018-03-13 ENCOUNTER — HOSPITAL ENCOUNTER (OUTPATIENT)
Dept: ULTRASOUND IMAGING | Age: 68
Discharge: HOME | End: 2018-03-13

## 2018-03-13 DIAGNOSIS — R59.9 ADENOPATHY: ICD-10-CM

## 2018-03-13 PROCEDURE — 76536 US EXAM OF HEAD AND NECK: CPT

## 2018-04-16 ENCOUNTER — HOSPITAL ENCOUNTER (OUTPATIENT)
Dept: MAMMOGRAPHY | Age: 68
Discharge: HOME | End: 2018-04-16

## 2018-04-16 DIAGNOSIS — M89.9 DISORDER OF BONE: ICD-10-CM

## 2018-04-16 PROCEDURE — 77080 DXA BONE DENSITY AXIAL: CPT

## 2019-09-20 ENCOUNTER — HOSPITAL ENCOUNTER (OUTPATIENT)
Dept: MRI IMAGING | Age: 69
Discharge: HOME | End: 2019-09-20
Payer: MEDICARE

## 2019-09-20 DIAGNOSIS — R20.1 HYPOESTHESIA OF SKIN: ICD-10-CM

## 2019-09-20 DIAGNOSIS — M47.817 LUMBOSACRAL SPONDYLOSIS WITHOUT MYELOPATHY: ICD-10-CM

## 2019-09-20 DIAGNOSIS — M54.31 RIGHT SIDED SCIATICA: ICD-10-CM

## 2019-09-20 PROCEDURE — 72148 MRI LUMBAR SPINE W/O DYE: CPT

## 2020-06-18 ENCOUNTER — HOSPITAL ENCOUNTER (OUTPATIENT)
Dept: MAMMOGRAPHY | Age: 70
Discharge: HOME | End: 2020-06-18
Payer: MEDICARE

## 2020-06-18 DIAGNOSIS — M81.0 AGE-RELATED OSTEOPOROSIS WITHOUT CURRENT PATHOLOGICAL FRACTURE: ICD-10-CM

## 2020-06-18 PROCEDURE — 77080 DXA BONE DENSITY AXIAL: CPT

## 2022-03-19 PROBLEM — R32 INCONTINENCE OF URINE IN FEMALE: Status: ACTIVE | Noted: 2017-09-28

## 2022-03-19 PROBLEM — N99.3 PELVIC RELAXATION DUE TO VAGINAL VAULT PROLAPSE, POSTHYSTERECTOMY: Status: ACTIVE | Noted: 2017-09-28

## 2022-03-20 PROBLEM — N81.10 VAGINAL PROLAPSE: Status: ACTIVE | Noted: 2017-09-28

## 2022-09-27 ENCOUNTER — HOSPITAL ENCOUNTER (OUTPATIENT)
Dept: GENERAL RADIOLOGY | Age: 72
Discharge: HOME | End: 2022-09-27
Attending: INTERNAL MEDICINE
Payer: MEDICARE

## 2022-09-27 ENCOUNTER — TRANSCRIBE ORDER (OUTPATIENT)
Dept: GENERAL RADIOLOGY | Age: 72
End: 2022-09-27

## 2022-09-27 DIAGNOSIS — R05.3 CHRONIC COUGH: ICD-10-CM

## 2022-09-27 DIAGNOSIS — R05.3 CHRONIC COUGH: Primary | ICD-10-CM

## 2022-09-27 PROCEDURE — 71046 X-RAY EXAM CHEST 2 VIEWS: CPT

## 2022-10-03 ENCOUNTER — TRANSCRIBE ORDER (OUTPATIENT)
Dept: SCHEDULING | Age: 72
End: 2022-10-03

## 2022-10-03 DIAGNOSIS — R91.1 RIGHT LOWER LOBE PULMONARY NODULE: Primary | ICD-10-CM

## 2022-10-06 ENCOUNTER — HOSPITAL ENCOUNTER (OUTPATIENT)
Dept: CT IMAGING | Age: 72
Discharge: HOME | End: 2022-10-06
Attending: INTERNAL MEDICINE
Payer: MEDICARE

## 2022-10-06 DIAGNOSIS — R91.1 RIGHT LOWER LOBE PULMONARY NODULE: ICD-10-CM

## 2022-10-06 PROCEDURE — 71250 CT THORAX DX C-: CPT

## 2023-05-29 RX ORDER — ETIDRONATE DISODIUM 400 MG/1
400 TABLET ORAL
COMMUNITY

## 2023-05-29 RX ORDER — ASPIRIN 81 MG/1
81 TABLET, CHEWABLE ORAL
COMMUNITY

## 2023-05-29 RX ORDER — ASCORBIC ACID 500 MG
1000 TABLET ORAL
COMMUNITY

## 2023-05-29 RX ORDER — ROSUVASTATIN CALCIUM 5 MG/1
5 TABLET, COATED ORAL
COMMUNITY

## 2023-05-29 RX ORDER — PSEUDOEPHEDRINE HCL 30 MG
100 TABLET ORAL NIGHTLY
COMMUNITY

## 2023-05-29 RX ORDER — LORATADINE 10 MG/1
10 TABLET ORAL NIGHTLY
COMMUNITY

## 2023-05-29 RX ORDER — ACETAMINOPHEN,DIPHENHYDRAMINE HCL 500; 25 MG/1; MG/1
1 TABLET, FILM COATED ORAL
COMMUNITY

## 2023-05-29 RX ORDER — UBIDECARENONE 100 MG
300 CAPSULE ORAL
COMMUNITY

## 2023-05-29 RX ORDER — HYDROMORPHONE HYDROCHLORIDE 2 MG/1
2 TABLET ORAL EVERY 4 HOURS PRN
COMMUNITY
Start: 2017-09-29

## 2024-06-24 ENCOUNTER — HOSPITAL ENCOUNTER (OUTPATIENT)
Facility: HOSPITAL | Age: 74
Discharge: HOME | End: 2024-06-27
Payer: MEDICARE

## 2024-06-24 DIAGNOSIS — Z79.899 HIGH RISK MEDICATION USE: ICD-10-CM

## 2024-06-24 DIAGNOSIS — Z78.0 MENOPAUSE: ICD-10-CM

## 2024-06-24 DIAGNOSIS — M81.0 AGE-RELATED OSTEOPOROSIS WITHOUT CURRENT PATHOLOGICAL FRACTURE: ICD-10-CM

## 2024-06-24 PROCEDURE — 77080 DXA BONE DENSITY AXIAL: CPT

## (undated) DEVICE — 1200 GUARD II KIT W/5MM TUBE W/O VAC TUBE: Brand: GUARDIAN

## (undated) DEVICE — PAD SANIT NPKN 4IN GRD

## (undated) DEVICE — ROCKER SWITCH PENCIL BLADE ELECTRODE, HOLSTER: Brand: EDGE

## (undated) DEVICE — SUTURE MCRYL SZ 3-0 L27IN ABSRB UD L19MM PS-2 3/8 CIR PRIM Y427H

## (undated) DEVICE — SOLUTION IV 1000ML 0.9% SOD CHL

## (undated) DEVICE — SURGICAL PROCEDURE PACK BASIN MAJ SET CUST NO CAUT

## (undated) DEVICE — (D)SYR 10ML 1/5ML GRAD NSAF -- PKGING CHANGE USE ITEM 338027

## (undated) DEVICE — SUTURE VCRL SZ 0 L18IN ABSRB VLT L26MM CT-2 1/2 CIR J727D

## (undated) DEVICE — INFECTION CONTROL KIT SYS

## (undated) DEVICE — STERILE POLYISOPRENE POWDER-FREE SURGICAL GLOVES: Brand: PROTEXIS

## (undated) DEVICE — TUBING IRRIG L77IN DIA0.241IN L BOR FOR CYSTO W/ NVENT

## (undated) DEVICE — DEVON™ KNEE AND BODY STRAP 60" X 3" (1.5 M X 7.6 CM): Brand: DEVON

## (undated) DEVICE — LEGGINGS: Brand: CONVERTORS

## (undated) DEVICE — Device

## (undated) DEVICE — Z INACTIVE USE 2527070 DRAPE SURG W40XL44IN UNDERBUTTOCK SMS POLYPR W/ PCH BK DISP

## (undated) DEVICE — KENDALL SCD EXPRESS SLEEVES, KNEE LENGTH, MEDIUM: Brand: KENDALL SCD

## (undated) DEVICE — SPONGE LAPAROTOMY W4XL18IN WHT STRUNG RADPQ PREWASHED ST

## (undated) DEVICE — LAVH/LAPAROSCOPY DRAPE: Brand: CONVERTORS

## (undated) DEVICE — BASIC PACK: Brand: CONVERTORS

## (undated) DEVICE — HANDLE LT SNAP ON ULT DURABLE LENS FOR TRUMPF ALC DISPOSABLE

## (undated) DEVICE — (D)PREP SKN CHLRAPRP APPL 26ML -- CONVERT TO ITEM 371833

## (undated) DEVICE — TIP SUCT BLU PLAS BLB W/O CTRL VENT YANK

## (undated) DEVICE — SUTURE VCRL SZ 2-0 L27IN ABSRB UD L26MM SH 1/2 CIR J417H

## (undated) DEVICE — TRAY PREP DRY W/ PREM GLV 2 APPL 6 SPNG 2 UNDPD 1 OVERWRAP

## (undated) DEVICE — SUTURE CAPIO SZ 0 L36IN NONABSORBABLE L26MM 1 2 CIR TC M0068331241

## (undated) DEVICE — MEDI-VAC NON-CONDUCTIVE SUCTION TUBING: Brand: CARDINAL HEALTH

## (undated) DEVICE — PREP PAD BNS: Brand: CONVERTORS

## (undated) DEVICE — HOOK RETRCT L5MM E SHRP SELF RET SYS LONE STAR

## (undated) DEVICE — BLADE ASSEMB CLP HAIR FINE --

## (undated) DEVICE — SPONGE: SPECIALTY PEANUT XR 100/CS: Brand: MEDICAL ACTION INDUSTRIES

## (undated) DEVICE — TRAY CATH 16F DRN BG LTX -- CONVERT TO ITEM 363158

## (undated) DEVICE — DERMABOND SKIN ADH 0.7ML -- DERMABOND ADVANCED 12/BX

## (undated) DEVICE — NEEDLE HYPO 25GA L1.5IN BVL ORIENTED ECLIPSE

## (undated) DEVICE — NEEDLE HYPO 22GA L1.5IN BLK S STL HUB POLYPR SHLD REG BVL

## (undated) DEVICE — TOWEL SURG W17XL27IN STD BLU COT NONFENESTRATED PREWASHED

## (undated) DEVICE — REM POLYHESIVE ADULT PATIENT RETURN ELECTRODE: Brand: VALLEYLAB

## (undated) DEVICE — RING RETRCTR FIG 8 32.5X18.3CM -- PLAS STRL W/2 CATH CLIPS

## (undated) DEVICE — GOWN,SIRUS,NONRNF,SETINSLV,XL,20/CS: Brand: MEDLINE